# Patient Record
Sex: MALE | Race: WHITE | NOT HISPANIC OR LATINO | URBAN - METROPOLITAN AREA
[De-identification: names, ages, dates, MRNs, and addresses within clinical notes are randomized per-mention and may not be internally consistent; named-entity substitution may affect disease eponyms.]

---

## 2021-01-21 ENCOUNTER — IMMUNIZATIONS (OUTPATIENT)
Dept: FAMILY MEDICINE CLINIC | Facility: HOSPITAL | Age: 76
End: 2021-01-21

## 2021-01-21 DIAGNOSIS — Z23 ENCOUNTER FOR IMMUNIZATION: Primary | ICD-10-CM

## 2021-01-21 PROCEDURE — 91301 SARS-COV-2 / COVID-19 MRNA VACCINE (MODERNA) 100 MCG: CPT

## 2021-01-21 PROCEDURE — 0011A SARS-COV-2 / COVID-19 MRNA VACCINE (MODERNA) 100 MCG: CPT

## 2021-02-18 ENCOUNTER — IMMUNIZATIONS (OUTPATIENT)
Dept: FAMILY MEDICINE CLINIC | Facility: HOSPITAL | Age: 76
End: 2021-02-18

## 2021-02-18 DIAGNOSIS — Z23 ENCOUNTER FOR IMMUNIZATION: Primary | ICD-10-CM

## 2021-02-18 PROCEDURE — 0012A SARS-COV-2 / COVID-19 MRNA VACCINE (MODERNA) 100 MCG: CPT

## 2021-02-18 PROCEDURE — 91301 SARS-COV-2 / COVID-19 MRNA VACCINE (MODERNA) 100 MCG: CPT

## 2023-02-10 ENCOUNTER — TELEPHONE (OUTPATIENT)
Dept: PSYCHIATRY | Facility: CLINIC | Age: 78
End: 2023-02-10

## 2023-02-10 ENCOUNTER — TELEPHONE (OUTPATIENT)
Dept: OTHER | Facility: OTHER | Age: 78
End: 2023-02-10

## 2023-02-10 NOTE — TELEPHONE ENCOUNTER
Pt called in regards to request both talk therapy and med mgmt services  Writer informed him that there are no openings available  Pt agreed to be placed on wait list     Preferences: in-person, either male or female; location: [facility]      Health insurance: Medicare A & B, HOP

## 2023-02-10 NOTE — TELEPHONE ENCOUNTER
Patient wishes to establish care and be seen by a psychiatrist  He would like a call back to advise

## 2023-02-14 ENCOUNTER — TELEPHONE (OUTPATIENT)
Dept: PSYCHIATRY | Facility: CLINIC | Age: 78
End: 2023-02-14

## 2023-02-14 NOTE — TELEPHONE ENCOUNTER
Contacted patient in regards to [employer] received, Patient is looking for med mgmt and talk therapy   Patient has been placed on non referral wait list, Suggest getting referral from pcp, Patient stated he had a referral fax, will check with intake in regards to referral

## 2023-02-24 NOTE — TELEPHONE ENCOUNTER
Received [employer] from [employer] regarding referral rec'd  Left VM for pt to contact intake to be placed on [facility]

## 2023-11-02 ENCOUNTER — HOSPITAL ENCOUNTER (EMERGENCY)
Facility: HOSPITAL | Age: 78
Discharge: HOME | End: 2023-11-02
Attending: EMERGENCY MEDICINE
Payer: MEDICARE

## 2023-11-02 ENCOUNTER — TELEPHONE (OUTPATIENT)
Dept: PSYCHIATRY | Facility: CLINIC | Age: 78
End: 2023-11-02

## 2023-11-02 VITALS
HEART RATE: 68 BPM | SYSTOLIC BLOOD PRESSURE: 126 MMHG | OXYGEN SATURATION: 99 % | RESPIRATION RATE: 18 BRPM | TEMPERATURE: 97.9 F | DIASTOLIC BLOOD PRESSURE: 68 MMHG

## 2023-11-02 DIAGNOSIS — Z18.39 EMBEDDED TICK OF FLANK, INITIAL ENCOUNTER: ICD-10-CM

## 2023-11-02 DIAGNOSIS — S30.851A EMBEDDED TICK OF FLANK, INITIAL ENCOUNTER: ICD-10-CM

## 2023-11-02 DIAGNOSIS — A69.20 ERYTHEMA MIGRANS (LYME DISEASE): Primary | ICD-10-CM

## 2023-11-02 LAB
ALBUMIN SERPL BCP-MCNC: 4.5 G/DL (ref 3.5–5)
ALP SERPL-CCNC: 52 U/L (ref 34–104)
ALT SERPL W P-5'-P-CCNC: 32 U/L (ref 7–52)
ANION GAP SERPL CALCULATED.3IONS-SCNC: 8 MMOL/L
AST SERPL W P-5'-P-CCNC: 26 U/L (ref 13–39)
BASOPHILS # BLD AUTO: 0.04 THOUSANDS/ÂΜL (ref 0–0.1)
BASOPHILS NFR BLD AUTO: 1 % (ref 0–1)
BILIRUB SERPL-MCNC: 0.44 MG/DL (ref 0.2–1)
BUN SERPL-MCNC: 26 MG/DL (ref 5–25)
CALCIUM SERPL-MCNC: 9.5 MG/DL (ref 8.4–10.2)
CHLORIDE SERPL-SCNC: 106 MMOL/L (ref 96–108)
CO2 SERPL-SCNC: 24 MMOL/L (ref 21–32)
CREAT SERPL-MCNC: 0.88 MG/DL (ref 0.6–1.3)
EOSINOPHIL # BLD AUTO: 0.18 THOUSAND/ÂΜL (ref 0–0.61)
EOSINOPHIL NFR BLD AUTO: 2 % (ref 0–6)
ERYTHROCYTE [DISTWIDTH] IN BLOOD BY AUTOMATED COUNT: 13.2 % (ref 11.6–15.1)
GFR SERPL CREATININE-BSD FRML MDRD: 82 ML/MIN/1.73SQ M
GLUCOSE SERPL-MCNC: 130 MG/DL (ref 65–140)
HCT VFR BLD AUTO: 43.7 % (ref 36.5–49.3)
HGB BLD-MCNC: 14.5 G/DL (ref 12–17)
IMM GRANULOCYTES # BLD AUTO: 0.03 THOUSAND/UL (ref 0–0.2)
IMM GRANULOCYTES NFR BLD AUTO: 0 % (ref 0–2)
LYMPHOCYTES # BLD AUTO: 2.32 THOUSANDS/ÂΜL (ref 0.6–4.47)
LYMPHOCYTES NFR BLD AUTO: 28 % (ref 14–44)
MCH RBC QN AUTO: 30 PG (ref 26.8–34.3)
MCHC RBC AUTO-ENTMCNC: 33.2 G/DL (ref 31.4–37.4)
MCV RBC AUTO: 90 FL (ref 82–98)
MONOCYTES # BLD AUTO: 0.76 THOUSAND/ÂΜL (ref 0.17–1.22)
MONOCYTES NFR BLD AUTO: 9 % (ref 4–12)
NEUTROPHILS # BLD AUTO: 5.02 THOUSANDS/ÂΜL (ref 1.85–7.62)
NEUTS SEG NFR BLD AUTO: 60 % (ref 43–75)
NRBC BLD AUTO-RTO: 0 /100 WBCS
PLATELET # BLD AUTO: 195 THOUSANDS/UL (ref 149–390)
PMV BLD AUTO: 9.7 FL (ref 8.9–12.7)
POTASSIUM SERPL-SCNC: 3.9 MMOL/L (ref 3.5–5.3)
PROT SERPL-MCNC: 7.3 G/DL (ref 6.4–8.4)
RBC # BLD AUTO: 4.84 MILLION/UL (ref 3.88–5.62)
SODIUM SERPL-SCNC: 138 MMOL/L (ref 135–147)
WBC # BLD AUTO: 8.35 THOUSAND/UL (ref 4.31–10.16)

## 2023-11-02 PROCEDURE — 36415 COLL VENOUS BLD VENIPUNCTURE: CPT

## 2023-11-02 PROCEDURE — 80053 COMPREHEN METABOLIC PANEL: CPT

## 2023-11-02 PROCEDURE — 99284 EMERGENCY DEPT VISIT MOD MDM: CPT

## 2023-11-02 PROCEDURE — 86618 LYME DISEASE ANTIBODY: CPT

## 2023-11-02 PROCEDURE — 99281 EMR DPT VST MAYX REQ PHY/QHP: CPT

## 2023-11-02 PROCEDURE — 85025 COMPLETE CBC W/AUTO DIFF WBC: CPT

## 2023-11-02 RX ORDER — DOXYCYCLINE HYCLATE 100 MG/1
100 CAPSULE ORAL 2 TIMES DAILY
Qty: 42 CAPSULE | Refills: 0 | Status: SHIPPED | OUTPATIENT
Start: 2023-11-02 | End: 2023-11-23

## 2023-11-02 RX ORDER — LIDOCAINE HYDROCHLORIDE AND EPINEPHRINE 10; 10 MG/ML; UG/ML
1 INJECTION, SOLUTION INFILTRATION; PERINEURAL ONCE
Status: COMPLETED | OUTPATIENT
Start: 2023-11-02 | End: 2023-11-02

## 2023-11-02 RX ORDER — DOXYCYCLINE HYCLATE 100 MG/1
100 CAPSULE ORAL ONCE
Status: COMPLETED | OUTPATIENT
Start: 2023-11-02 | End: 2023-11-02

## 2023-11-02 RX ADMIN — DOXYCYCLINE 100 MG: 100 CAPSULE ORAL at 20:12

## 2023-11-02 RX ADMIN — LIDOCAINE HYDROCHLORIDE,EPINEPHRINE BITARTRATE 1 ML: 10; .01 INJECTION, SOLUTION INFILTRATION; PERINEURAL at 20:11

## 2023-11-02 NOTE — ED PROVIDER NOTES
History  Chief Complaint   Patient presents with    Insect Bite     Pt was bitten by what appears to be a tick pt has picture on phone, the bite has redness around it     This is a 67 y/o male with PMH HTN, HLD s/p AVR, CAD, BPH who presents to the ER with tick bite on his left side. Patient reports he noticed it 2 days ago, said he noticed the tick was still attached today so his wife attempted to remove the tick. Has surrounding erythema. Minimal pain. Denies any fevers, chills, chest pain, SOB, abd pain, nausea, vomiting, neck pain, neck stiffness, confusion, full body rash, joint pains. Denies ever having lyme disease before. Admits he goes outside frequently so he is unsure exactly when this happened. He denies any allergies to medications. History provided by:  Patient   used: No        None       History reviewed. No pertinent past medical history. Past Surgical History:   Procedure Laterality Date    AORTIC VALVE REPLACEMENT         History reviewed. No pertinent family history. I have reviewed and agree with the history as documented. E-Cigarette/Vaping    E-Cigarette Use Never User      E-Cigarette/Vaping Substances     Social History     Tobacco Use    Smoking status: Never    Smokeless tobacco: Never   Vaping Use    Vaping Use: Never used   Substance Use Topics    Drug use: Never       Review of Systems   Constitutional:  Negative for chills and fever. Respiratory:  Negative for shortness of breath. Cardiovascular:  Negative for chest pain. Gastrointestinal:  Negative for abdominal pain, nausea and vomiting. Musculoskeletal:  Negative for neck pain and neck stiffness. Skin:  Positive for color change and rash. Neurological:  Negative for dizziness, weakness, numbness and headaches. Psychiatric/Behavioral:  Negative for confusion. Physical Exam  Physical Exam  Vitals and nursing note reviewed. Constitutional:       General: He is awake. Appearance: Normal appearance. He is well-developed. HENT:      Head: Normocephalic and atraumatic. Right Ear: External ear normal.      Left Ear: External ear normal.      Nose: Nose normal.   Eyes:      General: No scleral icterus. Extraocular Movements: Extraocular movements intact. Cardiovascular:      Rate and Rhythm: Normal rate and regular rhythm. Heart sounds: Normal heart sounds, S1 normal and S2 normal. No murmur heard. No gallop. Pulmonary:      Effort: Pulmonary effort is normal.      Breath sounds: Normal breath sounds. No wheezing, rhonchi or rales. Musculoskeletal:         General: Normal range of motion. Cervical back: Normal range of motion. Skin:     General: Skin is warm and dry. Findings: Erythema present. Comments: Dark circular area with surrounding red ring, then lighter red area surrounding it on left lateral torso. No red streaking. See photo below. Neurological:      General: No focal deficit present. Mental Status: He is alert. Psychiatric:         Attention and Perception: Attention and perception normal.         Mood and Affect: Mood normal.         Behavior: Behavior normal. Behavior is cooperative.          Vital Signs  ED Triage Vitals   Temperature Pulse Respirations Blood Pressure SpO2   11/02/23 1935 11/02/23 1935 11/02/23 1935 11/02/23 1935 11/02/23 1935   97.9 °F (36.6 °C) 71 18 (!) 187/85 99 %      Temp Source Heart Rate Source Patient Position - Orthostatic VS BP Location FiO2 (%)   11/02/23 1935 11/02/23 2015 11/02/23 2015 11/02/23 2015 --   Temporal Monitor Sitting Left arm       Pain Score       --                  Vitals:    11/02/23 1935 11/02/23 2015   BP: (!) 187/85 126/68   Pulse: 71 68   Patient Position - Orthostatic VS:  Sitting         Visual Acuity      ED Medications  Medications   doxycycline hyclate (VIBRAMYCIN) capsule 100 mg (100 mg Oral Given 11/2/23 2012)   lidocaine-epinephrine (XYLOCAINE/EPINEPHRINE) 1 %-1:100,000 injection 1 mL (1 mL Infiltration Given by Other 11/2/23 2011)       Diagnostic Studies  Results Reviewed       Procedure Component Value Units Date/Time    Comprehensive metabolic panel [[id number]]  (Abnormal) Collected: 11/02/23 2006    Lab Status: Final result Specimen: Blood from Arm, Right Updated: 11/02/23 2032     Sodium 138 mmol/L      Potassium 3.9 mmol/L      Chloride 106 mmol/L      CO2 24 mmol/L      ANION GAP 8 mmol/L      BUN 26 mg/dL      Creatinine 0.88 mg/dL      Glucose 130 mg/dL      Calcium 9.5 mg/dL      AST 26 U/L      ALT 32 U/L      Alkaline Phosphatase 52 U/L      Total Protein 7.3 g/dL      Albumin 4.5 g/dL      Total Bilirubin 0.44 mg/dL      eGFR 82 ml/min/1.73sq m     Narrative:      [place] guidelines for Chronic Kidney Disease (CKD):     Stage 1 with normal or high GFR (GFR > 90 mL/min/1.73 square meters)    Stage 2 Mild CKD (GFR = 60-89 mL/min/1.73 square meters)    Stage 3A Moderate CKD (GFR = 45-59 mL/min/1.73 square meters)    Stage 3B Moderate CKD (GFR = 30-44 mL/min/1.73 square meters)    Stage 4 Severe CKD (GFR = 15-29 mL/min/1.73 square meters)    Stage 5 End Stage CKD (GFR <15 mL/min/1.73 square meters)  Note: GFR calculation is accurate only with a steady state creatinine    CBC and differential [[id number]] Collected: 11/02/23 2006    Lab Status: Final result Specimen: Blood from Arm, Right Updated: 11/02/23 2014     WBC 8.35 Thousand/uL      RBC 4.84 Million/uL      Hemoglobin 14.5 g/dL      Hematocrit 43.7 %      MCV 90 fL      MCH 30.0 pg      MCHC 33.2 g/dL      RDW 13.2 %      MPV 9.7 fL      Platelets 771 Thousands/uL      nRBC 0 /100 WBCs      Neutrophils Relative 60 %      Immat GRANS % 0 %      Lymphocytes Relative 28 %      Monocytes Relative 9 %      Eosinophils Relative 2 %      Basophils Relative 1 %      Neutrophils Absolute 5.02 Thousands/µL      Immature Grans Absolute 0.03 Thousand/uL      Lymphocytes Absolute 2.32 Thousands/µL      Monocytes Absolute 0.76 Thousand/µL      Eosinophils Absolute 0.18 Thousand/µL      Basophils Absolute 0.04 Thousands/µL     Lyme Total AB W Reflex to IGM/IGG [[id number]] Collected: 11/02/23 2006    Lab Status: In process Specimen: Blood from Arm, Right Updated: 11/02/23 2010    Narrative: The following orders were created for panel order Lyme Total AB W Reflex to IGM/IGG. Procedure                               Abnormality         Status                     ---------                               -----------         ------                     Lyme Total AB W Reflex t. Letta Curd Letta Curd [[id number]]                      In process                   Please view results for these tests on the individual orders. Lyme Total AB W Reflex to IGM/IGG [[id number]] Collected: 11/02/23 2006    Lab Status: In process Specimen: Blood from Arm, Right Updated: 11/02/23 2010                   No orders to display              Procedures  Foreign Body - Embedded    Date/Time: 11/2/2023 8:30 PM    Performed by: Chinedu Mccarthy PA-C  Authorized by: Chinedu Mccarthy PA-C    Patient location:  ED  Other Assisting Provider: No    Consent:     Consent obtained:  Verbal    Consent given by:  Patient    Risks discussed:  Bleeding, infection and pain  Universal protocol:     Procedure explained and questions answered to patient or proxy's satisfaction: yes      Patient identity confirmed:  Verbally with patient  Location:     Location:  Trunk    Trunk location:  L flank    Depth: Intradermal    Tendon involvement:  None  Pre-procedure details:     Imaging:  None    Neurovascular status: intact      Preparation: Patient was prepped and draped in usual sterile fashion    Anesthesia (see MAR for exact dosages):      Anesthesia method:  Local infiltration    Local anesthetic:  Lidocaine 1% WITH epi  Procedure details:     Scalpel size:  11    Localization method:  Visualized    Dissection of underlying tissues: no      Bloodless field: no      Removal mechanism:  Alligator forceps    Procedure complexity:  Simple    Foreign bodies recovered:  3    Description:  3 tiny pieces of tick    Intact foreign body removal: no    Post-procedure details:     Confirmation:  No additional foreign bodies on visualization    Skin closure:  None    Dressing:  Sterile dressing    Patient tolerance of procedure: Tolerated well, no immediate complications           ED Course                               SBIRT 22yo+      Flowsheet Row Most Recent Value   Initial Alcohol Screen: US AUDIT-C     1. How often do you have a drink containing alcohol? 0 Filed at: 11/02/2023 1939   2. How many drinks containing alcohol do you have on a typical day you are drinking? 0 Filed at: 11/02/2023 1939   3a. Male UNDER 65: How often do you have five or more drinks on one occasion? 0 Filed at: 11/02/2023 1939   3b. FEMALE Any Age, or MALE 65+: How often do you have 4 or more drinks on one occassion? 0 Filed at: 11/02/2023 1939   Audit-C Score 0 Filed at: 11/02/2023 1939   АННА: How many times in the past year have you. .. Used an illegal drug or used a prescription medication for non-medical reasons? Never Filed at: 11/02/2023 1939                      Medical Decision Making  67 y/o male here for rash/tick bite on left side area  Differential diagnosis including but not limited to: erythema migrans secondary to lyme disease, cellulitis, MRSA  Assessment: erythema migrans, embedded tick  Plan: FB removed at bedside. Since tick likely attached > 72 hours and patient does have signs of erythema migrans on exam, will treat with doxycycline BID x 21 days. Advised PCP f/u to monitor medication/treatment. Informed patient we will call if lyme results (+). He  was given strict return to ER precautions both verbally and in discharge papers. Patient verbalized understanding and agrees with plan. Amount and/or Complexity of Data Reviewed  External Data Reviewed: notes. Details: most recent cardiology note from 2/13/23  Labs: ordered. Risk  Prescription drug management. Disposition  Final diagnoses:   Erythema migrans (Lyme disease)   Embedded tick of flank, initial encounter     Time reflects when diagnosis was documented in both MDM as applicable and the Disposition within this note       Time User Action Codes Description Comment    11/2/2023  9:02 PM Belkis Nyhan Add [A69.20] Erythema migrans (Lyme disease)     11/2/2023  9:02 PM Belkis Nyhan Add [O20.201H,  Z18.39] Embedded tick of flank, initial encounter           ED Disposition       ED Disposition   Discharge    Condition   Stable    Date/Time   Thu Nov 2, 2023 2102    [address] discharge to home/self care. Follow-up Information       Follow up With Specialties Details Why Contact Info Additional Information    Guerline Freeman MD Family Medicine Schedule an appointment as soon as possible for a visit   [address] [facility] [address] [facility] Emergency Department Emergency Medicine Go to  If symptoms worsen [address] [facility] [place] [address]  [address] [facility] Emergency Department, [address] [facility], [place], [address]            Discharge Medication List as of 11/2/2023  9:04 PM        START taking these medications    Details   doxycycline hyclate (VIBRAMYCIN) 100 mg capsule Take 1 capsule (100 mg total) by mouth 2 (two) times a day for 21 days, Starting Thu 11/2/2023, Until Thu 11/23/2023, Normal             No discharge procedures on file.     PDMP Review       None            ED Provider  Electronically Signed by             Yaron Winters PA-C  11/02/23 2938

## 2023-11-03 LAB — B BURGDOR IGG+IGM SER QL IA: NEGATIVE

## 2023-11-03 NOTE — DISCHARGE INSTRUCTIONS
Take doxycyycline 100 mg twice a day for next 21 days  Schedule appt with PCP for follow up  We will call if lyme results +

## 2024-04-26 ENCOUNTER — OFFICE VISIT (OUTPATIENT)
Dept: URGENT CARE | Facility: CLINIC | Age: 79
End: 2024-04-26
Payer: MEDICARE

## 2024-04-26 VITALS
TEMPERATURE: 97.3 F | RESPIRATION RATE: 16 BRPM | HEART RATE: 84 BPM | WEIGHT: 188 LBS | SYSTOLIC BLOOD PRESSURE: 146 MMHG | OXYGEN SATURATION: 97 % | DIASTOLIC BLOOD PRESSURE: 88 MMHG

## 2024-04-26 DIAGNOSIS — S61.217A LACERATION OF LEFT LITTLE FINGER WITHOUT FOREIGN BODY WITHOUT DAMAGE TO NAIL, INITIAL ENCOUNTER: Primary | ICD-10-CM

## 2024-04-26 PROCEDURE — G0463 HOSPITAL OUTPT CLINIC VISIT: HCPCS

## 2024-04-26 PROCEDURE — 99213 OFFICE O/P EST LOW 20 MIN: CPT

## 2024-04-26 PROCEDURE — 12001 RPR S/N/AX/GEN/TRNK 2.5CM/<: CPT

## 2024-04-26 RX ORDER — PREDNISOLONE ACETATE 10 MG/ML
SUSPENSION/ DROPS OPHTHALMIC
COMMUNITY
Start: 2024-02-15

## 2024-04-26 RX ORDER — SERTRALINE HYDROCHLORIDE 25 MG/1
25 TABLET, FILM COATED ORAL DAILY
COMMUNITY
Start: 2024-02-20

## 2024-04-26 RX ORDER — ATORVASTATIN CALCIUM 40 MG/1
TABLET, FILM COATED ORAL
COMMUNITY

## 2024-04-26 RX ORDER — ASPIRIN 81 MG/1
81 TABLET, CHEWABLE ORAL DAILY
COMMUNITY

## 2024-04-26 RX ORDER — LOTEPREDNOL ETABONATE 5 MG/ML
SUSPENSION/ DROPS OPHTHALMIC
COMMUNITY
Start: 2024-04-11

## 2024-04-26 RX ORDER — MOXIFLOXACIN 5 MG/ML
SOLUTION/ DROPS OPHTHALMIC
COMMUNITY
Start: 2024-02-15

## 2024-04-26 RX ORDER — LISINOPRIL 40 MG/1
TABLET ORAL
COMMUNITY

## 2024-04-26 RX ORDER — DORZOLAMIDE HYDROCHLORIDE AND TIMOLOL MALEATE 20; 5 MG/ML; MG/ML
1 SOLUTION/ DROPS OPHTHALMIC 2 TIMES DAILY
COMMUNITY
Start: 2024-04-11

## 2024-04-26 NOTE — PROGRESS NOTES
[facility]        NAME: Tyrone Cunha is a 79 y.o. male  : 1945    MRN: [id number]  DATE: 2024  TIME: 2:23 PM    Assessment and Plan   Laceration of left little finger without foreign body without damage to nail, initial encounter [S61.217A]  1. Laceration of left little finger without foreign body without damage to nail, initial encounter          Wound cleaned by nurse Lucy  3, 6-0 Ethilon sutures placed  Educated on signs and symptoms of infection  Will return to the office in 10 to 14 days for suture removal    Patient Instructions   3, 6-0 Ethilon sutures placed  Monitor for increasing redness, swelling, warmth, drainage, pain  Return to the office in 10 to 14 days for suture removal  Keep clean and dry  Tetanus shot up-to-date    Follow up with PCP in 3-5 days.  Proceed to  ER if symptoms worsen.    If tests have been performed at [facility], our office will contact you with results if changes need to be made to the care plan discussed with you at the visit.  [facility]    Chief Complaint     Chief Complaint   Patient presents with    Finger Laceration     Pt reports he cut his left 5th finger with clippers when he was cutting bushes. Last tetanus 2023.         History of Present Illness       79-year-old male presents for left little finger laceration sustained today.  Patient admits he was using hedge cutters when he accidentally cut his finger.  Located on the distal pad pad.  No nail involvement.  No numbness, tingling.  Patient admits his wife made him present to the urgent care.  Tetanus shot .        Review of Systems   Review of Systems   Skin:  Positive for wound.         Current Medications       Current Outpatient Medications:     aspirin 81 mg chewable tablet, Chew 81 mg daily, Disp: , Rfl:     atorvastatin (LIPITOR) 40 mg tablet, , Disp: , Rfl:     Cholecalciferol 50 MCG ( UT) CAPS, 1 capsule every 24 hours, Disp: , Rfl:      dorzolamide-timolol (COSOPT) 2-0.5 % ophthalmic solution, 1 drop 2 (two) times a day, Disp: , Rfl:     lisinopril (ZESTRIL) 40 mg tablet, , Disp: , Rfl:     loteprednol etabonate (LOTEMAX) 0.5 % ophthalmic suspension, 1 drop in the operative eye 4x a day for 4 weeks and taper as instructed, Disp: , Rfl:     moxifloxacin (VIGAMOX) 0.5 % ophthalmic solution, 1 drop in operative eye four times a day 3 days prior surgery, continuing 7 days after surgery then STOP, Disp: , Rfl:     prednisoLONE acetate (PRED FORTE) 1 % ophthalmic suspension, Starting after surgery, 1 drop in the operative eye 4 times daily and taper over 4 weeks as instructed., Disp: , Rfl:     sertraline (ZOLOFT) 25 mg tablet, Take 25 mg by mouth daily, Disp: , Rfl:     Current Allergies     Allergies as of 04/26/2024    (No Known Allergies)            The following portions of the patient's history were reviewed and updated as appropriate: allergies, current medications, past family history, past medical history, past social history, past surgical history and problem list.     No past medical history on file.    Past Surgical History:   Procedure Laterality Date    AORTIC VALVE REPLACEMENT         No family history on file.      Medications have been verified.        Objective   /88   Pulse 84   Temp (!) 97.3 °F (36.3 °C)   Resp 16   Wt 85.3 kg (188 lb)   SpO2 97%   No LMP for male patient.       Physical Exam     Physical Exam  Vitals and nursing note reviewed.   Constitutional:       General: He is not in acute distress.     Appearance: He is not toxic-appearing.   HENT:      Head: Normocephalic and atraumatic.   Eyes:      Conjunctiva/sclera: Conjunctivae normal.   Pulmonary:      Effort: Pulmonary effort is normal.   Skin:     Comments: 2 mm linear laceration to the distal pad of the left little finger  No nail involvement   Neurological:      Mental Status: He is alert.   Psychiatric:         Mood and Affect: Mood normal.          "Behavior: Behavior normal.             Universal Protocol:  Consent: Verbal consent obtained.  Risks and benefits: risks, benefits and alternatives were discussed  Consent given by: patient  Time out: Immediately prior to procedure a \"time out\" was called to verify the correct patient, procedure, equipment, support staff and site/side marked as required.  Patient understanding: patient states understanding of the procedure being performed  Patient identity confirmed: verbally with patient  Laceration repair    Date/Time: 4/26/2024 2:00 PM    Performed by: Perez Lawler PA-C  Authorized by: Perez Lawler PA-C  Location: Left little finger.  Foreign bodies: no foreign bodies    Anesthesia:  Local Anesthetic: lidocaine 1% without epinephrine  Anesthetic total: 2 mL    Sedation:  Patient sedated: no        Procedure Details:  Preparation: Patient was prepped and draped in the usual sterile fashion.  Skin closure: Ethilon (6-0)  Number of sutures: 3  Technique: simple  Patient tolerance: patient tolerated the procedure well with no immediate complications              "

## 2024-04-26 NOTE — PATIENT INSTRUCTIONS
3, 6-0 Ethilon sutures placed  Monitor for increasing redness, swelling, warmth, drainage, pain  Return to the office in 10 to 14 days for suture removal  Keep clean and dry  Tetanus shot up-to-date  Follow-up with PCP in 3 to 5 days  Proceed to the ER if symptoms worsen or for any concerns